# Patient Record
Sex: MALE | Race: BLACK OR AFRICAN AMERICAN | NOT HISPANIC OR LATINO | Employment: FULL TIME | URBAN - METROPOLITAN AREA
[De-identification: names, ages, dates, MRNs, and addresses within clinical notes are randomized per-mention and may not be internally consistent; named-entity substitution may affect disease eponyms.]

---

## 2020-05-20 ENCOUNTER — HOSPITAL ENCOUNTER (EMERGENCY)
Facility: HOSPITAL | Age: 35
Discharge: HOME | End: 2020-05-20
Attending: EMERGENCY MEDICINE
Payer: COMMERCIAL

## 2020-05-20 VITALS
TEMPERATURE: 99 F | HEIGHT: 72 IN | BODY MASS INDEX: 27.09 KG/M2 | HEART RATE: 54 BPM | OXYGEN SATURATION: 99 % | SYSTOLIC BLOOD PRESSURE: 106 MMHG | DIASTOLIC BLOOD PRESSURE: 57 MMHG | WEIGHT: 200 LBS | RESPIRATION RATE: 20 BRPM

## 2020-05-20 DIAGNOSIS — D17.22 LIPOMA OF LEFT AXILLA: Primary | ICD-10-CM

## 2020-05-20 PROCEDURE — 99284 EMERGENCY DEPT VISIT MOD MDM: CPT | Mod: 25,,, | Performed by: EMERGENCY MEDICINE

## 2020-05-20 PROCEDURE — 25000003 PHARM REV CODE 250: Performed by: EMERGENCY MEDICINE

## 2020-05-20 PROCEDURE — 99284 PR EMERGENCY DEPT VISIT,LEVEL IV: ICD-10-PCS | Mod: 25,,, | Performed by: EMERGENCY MEDICINE

## 2020-05-20 PROCEDURE — 10160 PNXR ASPIR ABSC HMTMA BULLA: CPT | Mod: LT,,, | Performed by: EMERGENCY MEDICINE

## 2020-05-20 PROCEDURE — 99283 EMERGENCY DEPT VISIT LOW MDM: CPT

## 2020-05-20 PROCEDURE — 10160 PR PUNCTURE DRAINAGE, LESION: ICD-10-PCS | Mod: LT,,, | Performed by: EMERGENCY MEDICINE

## 2020-05-20 RX ORDER — LIDOCAINE HYDROCHLORIDE 10 MG/ML
5 INJECTION, SOLUTION EPIDURAL; INFILTRATION; INTRACAUDAL; PERINEURAL
Status: DISCONTINUED | OUTPATIENT
Start: 2020-05-20 | End: 2020-05-20

## 2020-05-20 RX ADMIN — Medication 1 ML: at 07:05

## 2020-05-20 NOTE — ED PROVIDER NOTES
Encounter Date: 5/20/2020       History     Chief Complaint   Patient presents with    L underarm Lymph Node      swollen L underarm lymph node for weeks, pain started 2 days ago. tingling sensation from L underarm down to his fingertips since Monday     Geovany Cornejo is a 34 year old M with no PMH presenting for left underarm swelling. Patient reports for the past few days he has been noticing a tingling sensation with shaving or antiperspirant use, but didn't pay much attention to it until today he noticed increased swelling of the underarm. He denies any cuts or scratches, fever, chills, tenderness/redness of the his underarm or weight loss. He reports no new changes in soaps or deodorants. He doesn't have any swelling in other parts of his body. No new travel. He also reports some numbness on his left arm, otherwise denies chest pain, SOB, diaphoresis, neck or jaw pain. He denies sexual activity with any one or any history of HIV. 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        Review of patient's allergies indicates:  No Known Allergies  History reviewed. No pertinent past medical history.  History reviewed. No pertinent surgical history.  History reviewed. No pertinent family history.  Social History     Tobacco Use    Smoking status: Light Tobacco Smoker     Packs/day: 0.50     Types: Cigars, Cigarettes    Smokeless tobacco: Never Used   Substance Use Topics    Alcohol use: Yes     Comment: socially     Drug use: Not Currently     Review of Systems   Constitutional: Negative for activity change, chills and fever.   Respiratory: Negative for cough, shortness of breath and wheezing.    Cardiovascular: Negative for chest pain, palpitations and leg swelling.   Gastrointestinal: Negative for abdominal pain, constipation, diarrhea, nausea and vomiting.   Genitourinary: Negative for difficulty urinating, dysuria and frequency.   Musculoskeletal: Negative for arthralgias.   Skin: Negative for color change and  pallor.        Left underarm swelling   Neurological: Positive for numbness (Left UE numbness). Negative for dizziness, weakness and light-headedness.   Psychiatric/Behavioral: Negative for agitation, behavioral problems and confusion.       Physical Exam     Initial Vitals [05/20/20 0600]   BP Pulse Resp Temp SpO2   126/63 (!) 54 20 98.7 °F (37.1 °C) 98 %      MAP       --         Physical Exam    Constitutional: He appears well-developed and well-nourished. He is not diaphoretic. No distress.   HENT:   Head: Normocephalic and atraumatic.   Eyes: EOM are normal. No scleral icterus.   Neck: Normal range of motion.   Cardiovascular: Normal rate, regular rhythm, normal heart sounds and intact distal pulses.   No murmur heard.  Pulmonary/Chest: Breath sounds normal. No respiratory distress. He has no wheezes. He has no rales.   Abdominal: Soft. Bowel sounds are normal. He exhibits no distension. There is no tenderness.   Musculoskeletal: Normal range of motion. He exhibits no edema or tenderness.   Neurological: He is alert and oriented to person, place, and time. He has normal reflexes.   Skin: Skin is warm.   Left underarm lymphnode swelling the size of a pea, mobile, non erythematous, non tender. No visualization of scratches         ED Course   Abscess Aspiration  Date/Time: 5/26/2020 8:13 AM  Performed by: Eunice Lerma MD  Authorized by: Eunice Lerma MD     Consent Done?:  Yes  Universal Protocol:     Verbal consent obtained?: Yes      Risks and benefits: Risks, benefits and alternatives were discussed      Consent given by:  Patient    Time out: Immediately prior to the procedure a time out was called    A time out verifies correct patient, procedure, equipment, support staff and site/side marked as required:   Procedure Details:     Site prepped with:  Alcohol    Location of Abscess #1:  Left axilla    Size of needle #1:  18    ASPIRATED AMOUNT: no fluid aspirated.  Post-procedure:     Patient tolerance:   Patient tolerated the procedure well with no immediate complications     Likely lipoma      Labs Reviewed - No data to display       Imaging Results    None          Medical Decision Making:   History:   Old Medical Records: I decided to obtain old medical records.  Old Records Summarized: records from previous admission(s).  Initial Assessment:   Geovany Cornejo is a 34 year old M with no PMH presenting for left underarm lymph node swelling without associated skin breaks, fever, chills, tenderness or redness of the underarm. No swelling in other body areas. On my assessment with ultrasound, it does appear like a superficial cyst. The cyst is mobile, pea sized in shape, non tender or erythematous and there was no skin breaks on the entire arm. Also didn't appreciate any other lymphadenopathy in contralateral under arm, cervical or groin areas.   Differential Diagnosis:   Includes but not limited to:  Epidermal cyst (some debris noted in the basal area of the cyst on bedside US)  Fatty lipoma   Abscess (very less likely given not tender, non-erythematous as well as reassuring findings on US)    Clinical Tests:   Medical Tests: Ordered and Reviewed  ED Management:  On my assessment with ultrasound, it does appear like a superficial cyst consisting of fluid w/ some dependent debris. The cyst is mobile, pea sized in shape, non tender or erythematous and there was no skin breaks on the entire arm. Also didn't appreciate any other lymphadenopathy in contralateral under arm, cervical or groin areas.   Explained to the patient that the cyst can be drained here or we can refer to general surgery for cyst removal.   Patient requested aspiration of the cyst in the ED, however we were unable to aspirate the cyst as there was no fluid collection which makes us believe this is likely a lipoma.  Referred patient to General Surgery for excision and advised to call soon.               Attending Attestation:   Physician Attestation  Statement for Resident:  As the supervising MD   Physician Attestation Statement: I have personally seen and examined this patient.   I agree with the above history. -: 34 no PMHx here with lesion in left axilla.  Non-tender, well circumscribed. No erythema.  No streaking    Needle aspiration performed without fluid.  Likely lipoma, will refer to surgery for excision if he chooses.   As the supervising MD I agree with the above PE.    As the supervising MD I agree with the above treatment, course, plan, and disposition.                                  Clinical Impression:       ICD-10-CM ICD-9-CM   1. Lipoma of left axilla D17.22 214.1         Disposition:   Disposition: Discharged  Condition: Stable                        Shweta Rangel MD  Resident  05/20/20 0928       Eunice Lerma MD  05/20/20 0934       Eunice Lerma MD  05/26/20 0820

## 2020-05-20 NOTE — ED TRIAGE NOTES
"Patient complaining of swollen lymph node under left armpit. "It's been a progression over a few weeks." Reports tingling sensation from left armpit down to fingers x2 days. Lymph node is hard, swollen, and mobile. Denies CP, SOB, n/v/d, fever. A&Ox4.   "

## 2020-05-20 NOTE — ED NOTES
All discharge instructions reviewed with patient and questions addressed. VSS, NAD noted, and patient A&Ox4. All belongings with patient at time of departure. Patient ambulating from department without difficulty.

## 2020-05-20 NOTE — DISCHARGE INSTRUCTIONS
You were seen today in the emergency department for lipoma of your left underarm which is a benign fatty growth. We will refer you to a General Surgeon for excision of this lipoma. Please give them a call at the number provided to you for an appointment. Return to the emergency department for any fever, chills, left arm pain or worsening swelling.

## 2020-05-20 NOTE — LETTER
May 20, 2020         [address]  [facility] [address]  Phone: [phone]  Fax: [phone]       Patient: Geovany Cornejo   YOB: 1985  Date of Visit: 05/20/2020    To Whom It May Concern:    Geovany Cornejo  was at [facility] on 05/20/2020. He may return to work/school on 05/22/2020 with no restrictions. If you have any questions or concerns, or if I can be of further assistance, please do not hesitate to contact me.      Sincerely,          Shweta Rangel MD

## 2020-05-20 NOTE — ED NOTES
Report received from MICHEAL Moctezuma and care assumed at this time. Patient resting in stretcher with NAD noted. VSS, following commands, and speech clear and appropriate. All belongings within reach. Patient denies any pain at this time. Patient updated on plan of care and all questions addressed. Safety precautions remain in place.

## 2023-02-07 ENCOUNTER — HOSPITAL ENCOUNTER (EMERGENCY)
Facility: HOSPITAL | Age: 38
Discharge: HOME | End: 2023-02-07
Attending: EMERGENCY MEDICINE
Payer: COMMERCIAL

## 2023-02-07 VITALS
SYSTOLIC BLOOD PRESSURE: 126 MMHG | TEMPERATURE: 98 F | DIASTOLIC BLOOD PRESSURE: 76 MMHG | HEART RATE: 78 BPM | BODY MASS INDEX: 25.73 KG/M2 | OXYGEN SATURATION: 99 % | HEIGHT: 72 IN | WEIGHT: 190 LBS | RESPIRATION RATE: 18 BRPM

## 2023-02-07 DIAGNOSIS — H11.32 SUBCONJUNCTIVAL HEMORRHAGE OF LEFT EYE: ICD-10-CM

## 2023-02-07 DIAGNOSIS — B30.9 VIRAL CONJUNCTIVITIS: Primary | ICD-10-CM

## 2023-02-07 PROCEDURE — 99284 EMERGENCY DEPT VISIT MOD MDM: CPT | Mod: ,,, | Performed by: PHYSICIAN ASSISTANT

## 2023-02-07 PROCEDURE — 99284 EMERGENCY DEPT VISIT MOD MDM: CPT

## 2023-02-07 PROCEDURE — 25000003 PHARM REV CODE 250: Performed by: PHYSICIAN ASSISTANT

## 2023-02-07 PROCEDURE — 99284 PR EMERGENCY DEPT VISIT,LEVEL IV: ICD-10-PCS | Mod: ,,, | Performed by: PHYSICIAN ASSISTANT

## 2023-02-07 RX ORDER — TETRACAINE HYDROCHLORIDE 5 MG/ML
2 SOLUTION OPHTHALMIC
Status: COMPLETED | OUTPATIENT
Start: 2023-02-07 | End: 2023-02-07

## 2023-02-07 RX ORDER — ERYTHROMYCIN 5 MG/G
OINTMENT OPHTHALMIC
Qty: 3.5 G | Refills: 0 | Status: SHIPPED | OUTPATIENT
Start: 2023-02-07 | End: 2024-02-01

## 2023-02-07 RX ORDER — VALACYCLOVIR HYDROCHLORIDE 500 MG/1
500 TABLET, FILM COATED ORAL 3 TIMES DAILY
Qty: 30 TABLET | Refills: 0 | Status: SHIPPED | OUTPATIENT
Start: 2023-02-07 | End: 2024-02-01

## 2023-02-07 RX ORDER — CYCLOPENTOLATE HYDROCHLORIDE 10 MG/ML
1 SOLUTION/ DROPS OPHTHALMIC 3 TIMES DAILY PRN
Qty: 15 ML | Refills: 0 | Status: SHIPPED | OUTPATIENT
Start: 2023-02-07 | End: 2024-02-01

## 2023-02-07 RX ADMIN — TETRACAINE HYDROCHLORIDE 2 DROP: 5 SOLUTION OPHTHALMIC at 01:02

## 2023-02-07 RX ADMIN — FLUORESCEIN SODIUM 1 EACH: 1 STRIP OPHTHALMIC at 01:02

## 2023-02-07 NOTE — ED PROVIDER NOTES
Encounter Date: 2/7/2023       History     Chief Complaint   Patient presents with    Eye Problem     Woke up L  eye red, no drainage, did throw up 2d ago and face was red     37-year-old male presents with left eye pain starting this morning. He has associated eye redness, sensitivity to light and blurry vision.  Of note he had several episodes of vomiting 2 days ago after suspected food poisoning.  He wears glasses normally but did wear contacts a few days ago. Denies trauma, foreign body sensation, discharge.    The history is provided by the patient.   Review of patient's allergies indicates:  No Known Allergies  History reviewed. No pertinent past medical history.  History reviewed. No pertinent surgical history.  History reviewed. No pertinent family history.  Social History     Tobacco Use    Smoking status: Light Smoker     Packs/day: 0.50     Types: Cigars, Cigarettes    Smokeless tobacco: Never   Substance Use Topics    Alcohol use: Yes     Comment: socially     Drug use: Not Currently     Review of Systems   Constitutional:  Negative for fever.   Eyes:  Positive for photophobia, pain, redness and visual disturbance. Negative for discharge.   Neurological:  Positive for headaches.     Physical Exam     Initial Vitals [02/07/23 1150]   BP Pulse Resp Temp SpO2   124/75 88 16 98.4 °F (36.9 °C) 98 %      MAP       --         Physical Exam    Nursing note and vitals reviewed.  Constitutional: He appears well-developed and well-nourished. He is not diaphoretic. No distress.   HENT:   Head: Normocephalic and atraumatic.   Eyes: EOM are normal. Pupils are equal, round, and reactive to light. Right eye exhibits no discharge. Left eye exhibits no chemosis, no discharge and no exudate. No foreign body present in the left eye. Left conjunctiva has a hemorrhage.   Slit lamp exam:       The left eye shows no corneal abrasion, no foreign body and no fluorescein uptake.   Neck: Neck supple.   Musculoskeletal:       Cervical back: Neck supple.     Neurological: He is alert and oriented to person, place, and time.   Skin: No rash noted.   Psychiatric: He has a normal mood and affect. His behavior is normal. Thought content normal.       ED Course   Procedures  Labs Reviewed - No data to display       Imaging Results    None          Medications   TETRAcaine HCl (PF) 0.5 % Drop 2 drop (2 drops Left Eye Given by Other 2/7/23 2480)   fluorescein ophthalmic strip 1 each (1 each Left Eye Given by Other 2/7/23 8457)     Medical Decision Making:   History:   Old Records Summarized: records from previous admission(s).       <> Summary of Records: ED visit in 5/2020 for abscess I&D  Initial Assessment:   37-year-old male presents with left eye pain starting this morning.  He is in no acute distress on presentation. Eye erythema noted on examination. No discharge or chemosis noted. He is sensitive to light. Will obtain visual acuity and examine eye under woods lamp.    Differential Diagnosis:   Subconjunctival hemorrhage, corneal abrasion, conjunctivitis, scleritis  ED Management:  Left visual acuity is 20/30 and right is 20/25 corrected.  Visual acuity is 20/25 bilaterally corrected.  Pain is unrelieved with tetracaine drop.  No foreign bodies, corneal abrasions/ulcers or fluorescein uptake noted while observing eye under Wood's lamp.  Ophthalmology consulted for further evaluation.  Concern for herpetic keratitis vs corneal abrasions/subconjunctival hemorrhage noted on exam by Ophthalmology.  Recommends outpatient oral Valtrex, erythromycin ointment, Cyclogyl.  Recommend follow-up with ophthalmology/optometry in 1 day.  Explained importance to the patient of taking Valtrex to prevent visual changes.  I believe he is stable at this time for discharge.  ED precautions given to return for new or worsening symptoms.  Other:   I discussed test(s) with the performing physician.          Attending Attestation:     Physician Attestation  Statement for NP/PA:   I discussed this assessment and plan of this patient with the NP/PA, but I did not personally examine the patient. The face to face encounter was performed by the NP/PA.                         Clinical Impression:   Final diagnoses:  [B30.9] Viral conjunctivitis (Primary)  [H11.32] Subconjunctival hemorrhage of left eye        ED Disposition Condition    Discharge Stable          ED Prescriptions       Medication Sig Dispense Start Date End Date Auth. Provider    valACYclovir (VALTREX) 500 MG tablet Take 1 tablet (500 mg total) by mouth 3 (three) times daily. for 10 days 30 tablet 2/7/2023 2/17/2023 Arielle Butler PA-C    cyclopentolate 1% (CYCLOGYL) 1 % ophthalmic solution Place 1 drop into the left eye 3 (three) times daily as needed (pain). 15 mL 2/7/2023 -- Arielle Butler PA-C    erythromycin (ROMYCIN) ophthalmic ointment Place a 1/2 inch ribbon of ointment into the lower eyelid. 3.5 g 2/7/2023 -- Arielle Butler PA-C          Follow-up Information       Follow up With Specialties Details Why Contact Info Additional Information    [facility] - Emergency Dept Emergency Medicine  If symptoms worsen [address] [facility] [id number] [phone]     [facility] - [address] [place] [address] Ophthalmology Schedule an appointment as soon as possible for a visit in 1 day  [address] [facility] [id number] [phone] Please arrive on the 10th floor for check-in.             Arielle Butler PA-C  02/07/23 1800       Ga Rivera III, MD  02/08/23 1257

## 2023-02-07 NOTE — ED TRIAGE NOTES
"Pt woke up with redness in left eye that has progressively worsened since.  Pt having pain to eye with radiating "internally".  Pt denies drainage.  Denies visual changes.   "

## 2023-02-07 NOTE — Clinical Note
"Geovany ARTHUR "Geovany ARTHUR"Clemente was seen and treated in our emergency department on 2/7/2023.  He may return to work on 02/09/2023.       If you have any questions or concerns, please don't hesitate to call.      Arielle Butler PA-C"

## 2023-02-07 NOTE — DISCHARGE INSTRUCTIONS
Recommend follow-up with your optometrist or Ophthalmology here in 1 day for reassessment  Your symptoms are most likely are due to viral conjunctivitis or a corneal abrasion/subconjunctival hemorrhage.  Please take all medications as prescribed  You may also use over-the-counter artificial tears for eye irritation  Recommend prompt ER or ophthalmologist evaluation if you have experienced visual changes   You may take Tylenol or ibuprofen over-the-counter for pain

## 2023-02-07 NOTE — ED NOTES
Patient identifiers verified and correct for Geovany Cornejo  LOC: The patient is awake, alert and aware of environment with an appropriate affect, the patient is oriented x 3 and speaking appropriately.   APPEARANCE: Patient appears comfortable and in no acute distress, patient is clean and well groomed.  SKIN: The skin is warm and dry, color consistent with ethnicity, patient has normal skin turgor and moist mucus membranes, skin intact, no breakdown or bruising noted.   EYES:  Left eye redness noted.  Swelling to eyelid.

## 2023-02-07 NOTE — CONSULTS
"Consultation Report  Ophthalmology Service    Date: 02/07/2023    Chief complaint/Reason for Consult: "left eye pain and subconj hemorrhage"     History of Present Illness: Geovany Cornejo is a 37 y.o. male with POHx of glasses and rare CL use who presents with left eye pain and redness.  Patient awoke this morning with a small left eye nasal area of subconj hemorrhage which steadily grew throughout this morning.  In addition, several hours after waking and first noticing the subconj heme, he noted an intraocular throbbing pain which has progressively worsened throughout the day, in addition to severe photophobia and slightly blurred vision in the same eye.  Last worse Cl's a few days ago.  He does say a couple days ago he ate some asian food and the next morning vomited many times, however denies other straining, denies recent eye trauma, metal grinding or woodworking but was weed wacking yesterday but doubts known FB to left eye.  In addition, denies prior episodes of this in the past, denies  pain or STD or polyarthralgia, denies low back pain, known oral/mouth ulcers.     Patient denies other visual disturbances, such as flashes, floaters, or curtain-veil in visual field OU.    POcularHx: Rare CL use, glasses    Current eye gtts: Denies     Family Hx: Denies family history of glaucoma, macular degeneration, or blindness. family history is not on file.     PMHx:  has no past medical history on file.     PSurgHx:  has no past surgical history on file.     Home Medications:   Prior to Admission medications    Medication Sig Start Date End Date Taking? Authorizing Provider   ibuprofen (ADVIL,MOTRIN) 800 MG tablet Take 1 tablet (800 mg total) by mouth every 8 (eight) hours as needed for Pain. 10/26/14 2/7/23  Rod Manzano MD        Medications this encounter:     Allergies: has No Known Allergies.     Social:  reports that he has been smoking cigars and cigarettes. He has been smoking an average of .5 packs per " day. He has never used smokeless tobacco. He reports current alcohol use. He reports that he does not currently use drugs.     ROS: As per HPI    Ocular examination/Dilated fundus examination:  Base Eye Exam       Visual Acuity (Snellen - Linear)         Right Left    Dist cc 20/20 20/25              Tonometry (Tonopen, 3:15 PM)         Right Left    Pressure 22 20              Pupils         Dark Light Shape React APD    Right 5 3 Round Brisk None    Left 5 3 Round Brisk None              Visual Fields         Right Left     Full Full              Extraocular Movement         Right Left     Full, Ortho Full, Ortho              Dilation       Both eyes: 1% Mydriacyl @ 3:15 PM                  Slit Lamp and Fundus Exam       External Exam         Right Left    External Normal Normal              Slit Lamp Exam         Right Left    Lids/Lashes Normal Everted without FB, fine papillary tarsal conj changes    Conjunctiva/Sclera White and quiet flat CHINA from 6-9 o clock, small punctate conj staining over CHINA near limbus    Cornea Clear PEEs over limbus near CHINA which then transitions to a hazy whorled line as well as branching faint epi changes with subtle staining that extends towards VA and superiorly towards mid periphery of K    Anterior Chamber Deep and quiet Deep and quiet    Iris Round and reactive Round and reactive    Lens Clear Clear    Anterior Vitreous Normal Normal              Fundus Exam         Right Left    Disc Normal Normal    C/D Ratio 0.5 0.5    Macula Normal Normal    Vessels Normal Normal    Periphery Normal Normal                            Assessment/Plan:     1.Viral conjunctivitis, left eye  2. Subconj heme, left eye  - Ddx includes Herpetic eye infection, Traumatic healing corneal abrasion in light of relatively new subconj heme  - no obvious risk factors  - Corneal changes with concern for early herpetic infection, which would align with photophobia and deep eye pain plus papillary eye  changes, but no romana dendriform lesions  - out of caution will recommend to start Valtrex 500mg po TID for 10 days  - start Cyclogel 1% TID left eye given improved sx with dilation  - start Erythromycin arnold BID left eye  - start preservative free artificial tears QID daily left eye  - discussed importance of taking medications initially given uncertainty of current diagnosis however must cover for possible herpetic eye infection given vision threatening ability     Offered follow up to patient in [facility] on 2/10 however he opted to see his outside optometrist instead.  Gave strict return precautions and he can call [facility] as needed if unable to see outside optom within next 3-4 days as he will need close follow up initially.     Dw Dr Josue      Patient's Best Contact Number: [phone]    Josh Almazan MD PGY-2  [facility] Resident  02/07/2023  3:23 PM

## 2023-02-07 NOTE — FIRST PROVIDER EVALUATION
Emergency Department TeleTriage Encounter Note      CHIEF COMPLAINT    Chief Complaint   Patient presents with    Eye Problem     Woke up L  eye red, no drainage, did throw up 2d ago and face was red       VITAL SIGNS   Initial Vitals [02/07/23 1150]   BP Pulse Resp Temp SpO2   124/75 88 16 98.4 °F (36.9 °C) 98 %      MAP       --            ALLERGIES    Review of patient's allergies indicates:  No Known Allergies    PROVIDER TRIAGE NOTE  Patient presents with complaint of redness to the left eye. He reports vomiting two days ago. Denied visual changes. Denied injury. Denied drainage from eye.      Phy:   Constitutional: well nourished, well developed, appearing stated age, NAD   HEENT: NCAT, symmetrical lids, No obvious facial deformity.  Normal phonation. Normal Conjunctiva   Neck: NAROM   Respiratory: Normal effort.  No obvious use of accessory muscles   Musculoskeletal: Moved upper extremities well   Neuro: Alert, answers questions appropriately    Psych: appropriate mood and affect      Initial orders will be placed and care will be transferred to an alternate provider when patient is roomed for a full evaluation. Any additional orders and the final disposition will be determined by that provider.        ORDERS  Labs Reviewed   HIV 1 / 2 ANTIBODY   HEPATITIS C ANTIBODY       ED Orders (720h ago, onward)      Start Ordered     Status Ordering Provider    02/07/23 1257 02/07/23 1256  Visual acuity screening  Once         Ordered ALFONSO BLACKMON    02/07/23 1152 02/07/23 1151  HIV 1/2 Ag/Ab (4th Gen)  STAT         Acknowledged RUPA MERCER    02/07/23 1152 02/07/23 1151  Hepatitis C Antibody  STAT         Acknowledged RUPA MERCER              Virtual Visit Note: The provider triage portion of this emergency department evaluation and documentation was performed via [employer], a HIPAA-compliant telemedicine application, in concert with a tele-presenter in the room. A face to face patient  evaluation with one of my colleagues will occur once the patient is placed in an emergency department room.      DISCLAIMER: This note was prepared with [email or web address] voice recognition transcription software. Garbled syntax, mangled pronouns, and other bizarre constructions may be attributed to that software system.

## 2025-05-29 ENCOUNTER — HOSPITAL ENCOUNTER (EMERGENCY)
Facility: HOSPITAL | Age: 40
Discharge: HOME | End: 2025-05-29
Attending: STUDENT IN AN ORGANIZED HEALTH CARE EDUCATION/TRAINING PROGRAM
Payer: COMMERCIAL

## 2025-05-29 VITALS
WEIGHT: 200 LBS | TEMPERATURE: 98 F | SYSTOLIC BLOOD PRESSURE: 121 MMHG | BODY MASS INDEX: 27.09 KG/M2 | HEART RATE: 71 BPM | RESPIRATION RATE: 20 BRPM | HEIGHT: 72 IN | OXYGEN SATURATION: 99 % | DIASTOLIC BLOOD PRESSURE: 84 MMHG

## 2025-05-29 DIAGNOSIS — J02.9 SORE THROAT: Primary | ICD-10-CM

## 2025-05-29 LAB
GROUP A STREP MOLECULAR (OHS): NEGATIVE
SARS-COV-2 RDRP RESP QL NAA+PROBE: NEGATIVE

## 2025-05-29 PROCEDURE — 99282 EMERGENCY DEPT VISIT SF MDM: CPT

## 2025-05-29 PROCEDURE — 87651 STREP A DNA AMP PROBE: CPT | Performed by: STUDENT IN AN ORGANIZED HEALTH CARE EDUCATION/TRAINING PROGRAM

## 2025-05-29 PROCEDURE — U0002 COVID-19 LAB TEST NON-CDC: HCPCS | Performed by: STUDENT IN AN ORGANIZED HEALTH CARE EDUCATION/TRAINING PROGRAM
